# Patient Record
Sex: MALE | Race: WHITE
[De-identification: names, ages, dates, MRNs, and addresses within clinical notes are randomized per-mention and may not be internally consistent; named-entity substitution may affect disease eponyms.]

---

## 2022-12-13 ENCOUNTER — HOSPITAL ENCOUNTER (EMERGENCY)
Dept: HOSPITAL 94 - ER | Age: 72
Discharge: LEFT BEFORE DISCHARGE | End: 2022-12-13
Payer: SELF-PAY

## 2022-12-13 DIAGNOSIS — E16.2: Primary | ICD-10-CM

## 2022-12-13 DIAGNOSIS — Z53.21: ICD-10-CM

## 2022-12-13 NOTE — NUR
1705 PATIENT WITH DIABETES REGISTERED IN ER TO BE SEEN FOR DROPPING BLOOD 
SUGAR. PATIENT STATES HE THINKS HE TOOK TOO MUCH INSULIN EARLIER, SO HE DRANK 2 
CANS OF DR. CHIANG PRIOR TO ARRIVAL IN ER. BG PER PATIENT'S DEXCOM IS 93 AT 
THIS TIME. PATIENT IS ALERT AND WARM/DRY TO TOUCH. AND WILL BE TRIAGED NEXT. 



1715 PATIENT STATES BG IS OVER 100 AND HE IS FEELING FINE, SO HE DOES NOT 
BELIEVE THAT HE NEEDS TO BE SEEN AND DEPARTED FROM ER, AMBULATORY IN STABLE 
CONDITION.